# Patient Record
Sex: MALE | Race: WHITE | Employment: FULL TIME | URBAN - METROPOLITAN AREA
[De-identification: names, ages, dates, MRNs, and addresses within clinical notes are randomized per-mention and may not be internally consistent; named-entity substitution may affect disease eponyms.]

---

## 2022-04-07 DIAGNOSIS — M25.561 RIGHT KNEE PAIN, UNSPECIFIED CHRONICITY: Primary | ICD-10-CM

## 2022-04-11 ENCOUNTER — HOSPITAL ENCOUNTER (OUTPATIENT)
Dept: GENERAL RADIOLOGY | Age: 52
Discharge: HOME | End: 2022-04-11
Payer: COMMERCIAL

## 2022-04-11 DIAGNOSIS — M25.561 RIGHT KNEE PAIN, UNSPECIFIED CHRONICITY: ICD-10-CM

## 2022-04-11 PROCEDURE — 73564 X-RAY EXAM KNEE 4 OR MORE: CPT

## 2022-04-15 ENCOUNTER — OFFICE VISIT (OUTPATIENT)
Dept: ORTHOPEDIC SURGERY | Age: 52
End: 2022-04-15
Payer: COMMERCIAL

## 2022-04-15 VITALS
DIASTOLIC BLOOD PRESSURE: 94 MMHG | HEART RATE: 66 BPM | TEMPERATURE: 97.8 F | SYSTOLIC BLOOD PRESSURE: 137 MMHG | BODY MASS INDEX: 27.09 KG/M2 | WEIGHT: 200 LBS | HEIGHT: 72 IN | OXYGEN SATURATION: 99 %

## 2022-04-15 DIAGNOSIS — M17.11 UNILATERAL PRIMARY OSTEOARTHRITIS, RIGHT KNEE: Primary | ICD-10-CM

## 2022-04-15 PROCEDURE — 99203 OFFICE O/P NEW LOW 30 MIN: CPT | Performed by: ORTHOPAEDIC SURGERY

## 2022-04-15 NOTE — PROGRESS NOTES
4/15/2022    Chief Complaint: Right knee pain    Assessment: Osteoarthritis right knee    Plan: This patient and I did discuss the many options in treating knee osteoarthritis. We did discuss that we could continue to seek out nonoperative modalities, such as: NSAIDs, oral and topical analgesics, knee injections, knee braces, physical therapy, stretching, strengthening, and weight loss strategies, activity modification, ambulatory assistive devices. The patient stated their understanding with this, but and would like to proceed with nonsurgical management in the form of knee brace. HPI: This is a 45 y.o. male who complains of right knee pain. Onset was relatively quickly but without trauma. The patient has had activity dependent pain for one month. The patient has tried activity modification, physical therapy exercises, injections have not been attempted. The pain is in the lateral and medial knee, it is moderate in intensity. The patient feels unstable with the knee, fears falling, and has significant limitation with activities of daily living, recreation, and walks with a limp. Past Medical History:   Diagnosis Date    GERD (gastroesophageal reflux disease)        Past Surgical History:   Procedure Laterality Date    HX HEENT      SINUS SX X2    HX OTHER SURGICAL      VASECTOMY       Current Outpatient Medications on File Prior to Visit   Medication Sig Dispense Refill    azithromycin (ZITHROMAX Z-SUMANTH) 250 mg tablet Take 500 mg by mouth on day 1, then take 250 mg by mouth every 24 hours x 4 days (Patient not taking: Reported on 4/15/2022) 6 Tab 0    guaiFENesin-codeine (GUAIFENESIN AC) 100-10 mg/5 mL solution Take 10 mL by mouth three (3) times daily as needed for Cough. Max Daily Amount: 30 mL. (Patient not taking: Reported on 4/15/2022) 118 mL 0     No current facility-administered medications on file prior to visit.        No Known Allergies    Family History   Problem Relation Age of Onset  Hypertension Father        Social History     Socioeconomic History    Marital status:    Tobacco Use    Smoking status: Former Smoker    Smokeless tobacco: Never Used   Substance and Sexual Activity    Alcohol use: Yes     Comment: OCCASIONAL         Review of Systems:       General: Denies headache, lethargy, fever, weight loss  Ears/Nose/Throat: Denies ear discharge, drainage, nosebleeds, hoarse voice, dental problems  Cardiovascular: Denies chest pain, shortness of breath  Lungs: Denies chest pain, breathing problems, wheezing, pneumonia  Stomach: Denies stomach pain, heartburn, constipation, irritable bowel  Skin: Denies rash, sores, open wounds  Musculoskeletal: Admits to knee pain  Genitourinary: Denies dysuria, hematuria, polyuria  Gastrointestinal: Denies constipation, obstipation, diarrhea  Neurological: Denies changes in sight, smell, hearing, taste, seizures. Denies loss of consciousness.   Psychiatric: Denies depression, sleep pattern changes, anxiety, change in personality  Endocrine: Denies mood swings, heat or cold intolerance  Hematologic/Lymphatic: Denies anemia, purpura, petechia  Allergic/Immunologic: Denies swelling of throat, pain or swelling at lymph nodes      Physical Examination:    Visit Vitals  BP (!) 137/94 (BP 1 Location: Right arm, BP Patient Position: Sitting, BP Cuff Size: Large adult)   Pulse 66   Temp 97.8 °F (36.6 °C) (Tympanic)   Ht 6' (1.829 m)   Wt 200 lb (90.7 kg)   SpO2 99%   BMI 27.12 kg/m²        General: AOX3, no apparent distress  Psychiatric: mood and affect appropriate  Lungs: breathing is symmetric and unlabored bilaterally  Heart: regular rate and rhythm  Abdomen: no guarding  Head: normocephalic, atraumatic  Skin: No significant abnormalities, good turgor  Sensation intact to light touch: L1-S1 dermatomes  Muscular exam: 5/5 strength in all major muscle groups unless noted in specialty exam.    Extremities:      Left upper extremity: Full active and passive range of motion without pain, deformity, no open wound, strength 5/5 in all major muscle groups. Right upper extremity: Full active and passive range of motion without pain, deformity, no open wound, strength 5/5 in all major muscle groups. Left lower extremity: Full active and passive range of motion without pain, deformity, no open wound, strength 5/5 in all major muscle groups. Right lower extremity:  Varus deformity is noted. Range of motion of the knee is 0-125. Ligamentous testing of the knee indicates stability of the the MCL, LCL, PCL, and ACL. Lachman's, anterior and posterior drawer tests are specifically negative. Medial and lateral joint line tenderness to palpation is noted. Popliteal area is unremarkable. 1+  for effusion. + patellar crepitus. Patella tracks centrally. Pivot shift is negative. Strength testing is indicative of 5/5 strength at hip flexion, extension, knee flexion and extension, tibialis anterior, EHL, and FHL. Sensation is intact to light touch in the L1-S1 dermatomes. Capillary refill is less than 2 seconds in the toes. Diagnostics:    Pertinent Diagnostics:  Xrays are available of the right knee, they indicate moderate medial compartment, mild patellofemoral osteoarthritis of the knee joint, no significant other findings, no other osseus abnormalities, fractures, or dislocations. Procedures: none    Mr. Kailey Vyas has a reminder for a \"due or due soon\" health maintenance. I have asked that he contact his primary care provider for follow-up on this health maintenance.

## 2022-04-15 NOTE — PROGRESS NOTES
Identified pt with two pt identifiers (name and ). Reviewed chart in preparation for visit and have obtained necessary documentation. Mishel Garcia is a 45 y.o. male  Chief Complaint   Patient presents with    Knee Pain     RT Knee     Visit Vitals  BP (!) 137/94 (BP 1 Location: Right arm, BP Patient Position: Sitting, BP Cuff Size: Large adult)   Pulse 66   Temp 97.8 °F (36.6 °C) (Tympanic)   Ht 6' (1.829 m)   Wt 200 lb (90.7 kg)   SpO2 99%   BMI 27.12 kg/m²     1. Have you been to the ER, urgent care clinic since your last visit? Hospitalized since your last visit? No    2. Have you seen or consulted any other health care providers outside of the [address] [place] [address] since your last visit? Include any pap smears or colon screening.  No

## 2022-04-15 NOTE — LETTER
4/15/2022    Patient: Benoit Vega   YOB: 1970   Date of Visit: 4/15/2022     Pankaj Farley MD  [address]  [address] [address]  Via Fax: [phone]    Dear Pankaj Farley MD,      Thank you for referring Mr. Tabatha Gonzales to [facility] for evaluation. My notes for this consultation are attached. If you have questions, please do not hesitate to call me. I look forward to following your patient along with you.       Sincerely,    Veronica Barber, DO

## 2022-07-25 DIAGNOSIS — M25.562 LEFT KNEE PAIN, UNSPECIFIED CHRONICITY: Primary | ICD-10-CM

## 2022-07-27 ENCOUNTER — HOSPITAL ENCOUNTER (OUTPATIENT)
Dept: GENERAL RADIOLOGY | Age: 52
Discharge: HOME | End: 2022-07-27
Payer: COMMERCIAL

## 2022-07-27 ENCOUNTER — OFFICE VISIT (OUTPATIENT)
Dept: ORTHOPEDIC SURGERY | Age: 52
End: 2022-07-27
Payer: COMMERCIAL

## 2022-07-27 VITALS
TEMPERATURE: 96.2 F | OXYGEN SATURATION: 98 % | BODY MASS INDEX: 26.01 KG/M2 | WEIGHT: 192 LBS | HEIGHT: 72 IN | HEART RATE: 68 BPM | SYSTOLIC BLOOD PRESSURE: 121 MMHG | DIASTOLIC BLOOD PRESSURE: 87 MMHG

## 2022-07-27 DIAGNOSIS — M25.562 LEFT KNEE PAIN, UNSPECIFIED CHRONICITY: ICD-10-CM

## 2022-07-27 DIAGNOSIS — M17.11 UNILATERAL PRIMARY OSTEOARTHRITIS, RIGHT KNEE: Primary | ICD-10-CM

## 2022-07-27 PROCEDURE — 20610 DRAIN/INJ JOINT/BURSA W/O US: CPT | Performed by: ORTHOPAEDIC SURGERY

## 2022-07-27 PROCEDURE — 73564 X-RAY EXAM KNEE 4 OR MORE: CPT

## 2022-07-27 RX ORDER — BETAMETHASONE SODIUM PHOSPHATE AND BETAMETHASONE ACETATE 3; 3 MG/ML; MG/ML
6 INJECTION, SUSPENSION INTRA-ARTICULAR; INTRALESIONAL; INTRAMUSCULAR; SOFT TISSUE ONCE
Status: COMPLETED | OUTPATIENT
Start: 2022-07-27 | End: 2022-07-27

## 2022-07-27 RX ADMIN — BETAMETHASONE SODIUM PHOSPHATE AND BETAMETHASONE ACETATE 6 MG: 3; 3 INJECTION, SUSPENSION INTRA-ARTICULAR; INTRALESIONAL; INTRAMUSCULAR; SOFT TISSUE at 10:47

## 2022-07-27 NOTE — PROGRESS NOTES
[unfilled]  Luis Ish is a 45 y.o. male  Chief Complaint   Patient presents with    Knee Pain     Lt knee     Visit Vitals  /87 (BP 1 Location: Left arm, BP Patient Position: Sitting, BP Cuff Size: Adult)   Pulse 68   Temp (!) 96.2 °F (35.7 °C) (Tympanic)   Ht 6' (1.829 m)   Wt 192 lb (87.1 kg)   SpO2 98%   BMI 26.04 kg/m²     1. Have you been to the ER, urgent care clinic since your last visit? Hospitalized since your last visit? No    2. Have you seen or consulted any other health care providers outside of the [address] [place] [address] since your last visit? Include any pap smears or colon screening.  No

## 2022-07-27 NOTE — PROGRESS NOTES
7/27/2022      CC: Left knee pain    HPI:      This is a 45y.o. year old patient who complains of left knee pain, this is on the medial aspect of the knee, it has been present for a week. Onset was sudden without trauma. This pain is activity dependent, it causes a significant amount of difficulty with athletic activities and stairs. The patient complains of mechanical symptoms and clicking within the knee. It has gotten somewhat better in the past few days. PMH:  Past Medical History:   Diagnosis Date    GERD (gastroesophageal reflux disease)        PSxHx:  Past Surgical History:   Procedure Laterality Date    HX HEENT      SINUS SX X2    HX OTHER SURGICAL      VASECTOMY       Meds:    Current Outpatient Medications:     azithromycin (ZITHROMAX Z-SUMANTH) 250 mg tablet, Take 500 mg by mouth on day 1, then take 250 mg by mouth every 24 hours x 4 days, Disp: 6 Tab, Rfl: 0    guaiFENesin-codeine (GUAIFENESIN AC) 100-10 mg/5 mL solution, Take 10 mL by mouth three (3) times daily as needed for Cough.  Max Daily Amount: 30 mL., Disp: 118 mL, Rfl: 0    Current Facility-Administered Medications:     betamethasone (CELESTONE) injection 6 mg, 6 mg, Intra artICUlar, ONCE, Krystal Aguiar, DO    All:  No Known Allergies    Social Hx:  Social History     Socioeconomic History    Marital status:    Tobacco Use    Smoking status: Former    Smokeless tobacco: Never   Substance and Sexual Activity    Alcohol use: Yes     Comment: OCCASIONAL       Family Hx:  Family History   Problem Relation Age of Onset    Hypertension Father          Review of Systems:       General: Denies headache, lethargy, fever, weight loss  Ears/Nose/Throat: Denies ear discharge, drainage, nosebleeds, hoarse voice, dental problems  Cardiovascular: Denies chest pain, shortness of breath  Lungs: Denies chest pain, breathing problems, wheezing, pneumonia  Stomach: Denies stomach pain, heartburn, constipation, irritable bowel  Skin: Denies rash, sores, open wounds  Musculoskeletal: left knee pain  Genitourinary: Denies dysuria, hematuria, polyuria  Gastrointestinal: Denies constipation, obstipation, diarrhea  Neurological: Denies changes in sight, smell, hearing, taste, seizures. Denies loss of consciousness. Psychiatric: Denies depression, sleep pattern changes, anxiety, change in personality  Endocrine: Denies mood swings, heat or cold intolerance  Hematologic/Lymphatic: Denies anemia, purpura, petechia  Allergic/Immunologic: Denies swelling of throat, pain or swelling at lymph nodes      Physical Examination:    Visit Vitals  /87 (BP 1 Location: Left arm, BP Patient Position: Sitting, BP Cuff Size: Adult)   Pulse 68   Temp (!) 96.2 °F (35.7 °C) (Tympanic)   Ht 6' (1.829 m)   Wt 192 lb (87.1 kg)   SpO2 98%   BMI 26.04 kg/m²        General: AOX3, no apparent distress  Psychiatric: mood and affect appropriate  Lungs: breathing is symmetric and unlabored bilaterally  Heart: regular rate and rhythm  Abdomen: no guarding  Head: normocephalic, atraumatic  Skin: No significant abnormalities, good turgor  Sensation intact to light touch: C5-T1 dermatomes  Muscular exam: 5/5 strength in all major muscle groups unless noted in specialty exam.    Extremities      Right upper extremity: Extremity is examined, no evidence of gross deformity, no gross motor or sensory deficit. Full active and passive range of motion is noted. Muscle tone and bulk is within normal expected limits. Capillary refill is less than 2 seconds distally. Left upper extremity: Extremity is examined, no evidence of gross deformity, no gross motor or sensory deficit. Full active and passive range of motion is noted. Muscle tone and bulk is within normal expected limits. Capillary refill is less than 2 seconds distally. Right lower extremity: Extremity is examined, no evidence of gross deformity, no gross motor or sensory deficit. Full active and passive range of motion is noted. Muscle tone and bulk is within normal expected limits. Capillary refill is less than 2 seconds distally. Left lower extremity: No gross deformity. Knee indicates small effusion. Significant joint line tenderness to palpation medially, not laterally. Positive Chula's medially, not laterally. ACL, PCL, MCL, LCL are all intact to ligamentous testing. Capillary refill is less than 2 seconds distally. Knee flexion and extension is 5 out of 5 strength. Patella tracks centrally, no patellar grind. Diagnostics:    Pertinent Diagnostics: Xrays of the left knee indicate no fractures, osseus lesions, abnormalities, cartilage space is well maintained. Overall alignment is within normal limits, no effusion or other soft tissue abnormality. Assessment: Left knee pain, likely secondary to medial meniscal tear    Plan: This patient has the above-mentioned issue, I have had a long discussion with them regarding the treatment options which include nonoperative and operative. Due to the length of symptoms, as well as the clinical scenario, we have agreed to proceed with nonoperative management. I have advised the patient that should they have worsening symptoms, mechanical blockage to motion, or locked knee that they should call on an emergent basis. Otherwise, I did discuss the treatment options for this particular issue which include bracing, physical therapy, anti-inflammatories and pain medications as well as activity modification. I have also discussed the long-term effects of this particular issue which include progression of osteoarthritis faster than would normally have occurred. The patient will have an injection, bracing, diclofenac    Follow-up will be in 1 week, the patient does not need x-rays on follow-up. Mr. Manuel Chen has a reminder for a \"due or due soon\" health maintenance. I have asked that he contact his primary care provider for follow-up on this health maintenance.   Date of Procedure: 7/27/2022  PROCEDURE NOTE: Left knee injection of Celestone    Consent was obtained from the patient. The correct site was identified after confirmation with the patient. Following identification and confirmation of the correct site with the patient, the superolateral left knee was prepped in the normal sterile fashion. A local anesthetic of 1% lidocaine without epinephrine was then administered to the local tissues. Following, an injection of a mixture of  6 mg Celestone and 1% lidocaine without epinephrine was administered to the left knee. The needle was then withdrawn and the injection site dressed with a sterile bandage at the conclusion. The procedure was well tolerated by the patient. No immediate adverse reactions were noted. Post injection instructions were given.

## 2022-08-04 ENCOUNTER — VIRTUAL VISIT (OUTPATIENT)
Dept: ORTHOPEDIC SURGERY | Age: 52
End: 2022-08-04
Payer: COMMERCIAL

## 2022-08-04 DIAGNOSIS — M25.562 CHRONIC PAIN OF LEFT KNEE: Primary | ICD-10-CM

## 2022-08-04 DIAGNOSIS — G89.29 CHRONIC PAIN OF LEFT KNEE: Primary | ICD-10-CM

## 2022-08-04 PROCEDURE — 99212 OFFICE O/P EST SF 10 MIN: CPT | Performed by: ORTHOPAEDIC SURGERY

## 2022-08-05 NOTE — PROGRESS NOTES
8/5/2022      CC: left knee pain    HPI:      This is a 47y.o. year old male who presents for follow up of their left knee injection. The patient states that they have had no significant relief of their pain. The time since injection has been approximately a week. PMH:  Past Medical History:   Diagnosis Date    GERD (gastroesophageal reflux disease)        PSxHx:  Past Surgical History:   Procedure Laterality Date    HX HEENT      SINUS SX X2    HX OTHER SURGICAL      VASECTOMY       Meds:    Current Outpatient Medications:     azithromycin (ZITHROMAX Z-SUMANTH) 250 mg tablet, Take 500 mg by mouth on day 1, then take 250 mg by mouth every 24 hours x 4 days, Disp: 6 Tab, Rfl: 0    guaiFENesin-codeine (GUAIFENESIN AC) 100-10 mg/5 mL solution, Take 10 mL by mouth three (3) times daily as needed for Cough. Max Daily Amount: 30 mL., Disp: 118 mL, Rfl: 0    All:  No Known Allergies    Social Hx:  Social History     Socioeconomic History    Marital status:    Tobacco Use    Smoking status: Former    Smokeless tobacco: Never   Substance and Sexual Activity    Alcohol use: Yes     Comment: OCCASIONAL       Family Hx:  Family History   Problem Relation Age of Onset    Hypertension Father          Review of Systems:       General: Denies headache, lethargy, fever, weight loss  Ears/Nose/Throat: Denies ear discharge, drainage, nosebleeds, hoarse voice, dental problems  Cardiovascular: Denies chest pain, shortness of breath  Lungs: Denies chest pain, breathing problems, wheezing, pneumonia  Stomach: Denies stomach pain, heartburn, constipation, irritable bowel  Skin: Denies rash, sores, open wounds  Musculoskeletal: left knee pain - unresolved  Genitourinary: Denies dysuria, hematuria, polyuria  Gastrointestinal: Denies constipation, obstipation, diarrhea  Neurological: Denies changes in sight, smell, hearing, taste, seizures. Denies loss of consciousness.   Psychiatric: Denies depression, sleep pattern changes, anxiety, change in personality  Endocrine: Denies mood swings, heat or cold intolerance  Hematologic/Lymphatic: Denies anemia, purpura, petechia  Allergic/Immunologic: Denies swelling of throat, pain or swelling at lymph nodes      Physical Examination:    There were no vitals taken for this visit. General: AOX3, no apparent distress  Psychiatric: mood and affect appropriate        Diagnostics:    Pertinent Diagnostics: none    Assessment: Status post left knee injection  Plan: This patient and I discussed the normal course for injections, we discussed that pain relief will likely be temporary to some degree, but I cannot predict the longevity of relief. We also discussed the limitations of injections, and that I cannot inject the same area any more often than every three months. We will proceed with MRI. Patient was in [place] at the time of consultation. I was in the office while conducting this encounter. Consent:  He and/or his healthcare decision maker is aware that this patient-initiated Telehealth encounter is a billable service, with coverage as determined by his insurance carrier. He is aware that he may receive a bill and has provided verbal consent to proceed: Yes    This virtual visit was conducted telephone encounter only. -  I affirm this is a Patient Initiated Episode with an Established Patient who has not had a related appointment within my department in the past 7 days or scheduled within the next 24 hours. Note: this encounter is not billable if this call serves to triage the patient into an appointment for the relevant concern. Total Time: minutes: 5-10 minutes. Mr. Elroy Foley has a reminder for a \"due or due soon\" health maintenance. I have asked that he contact his primary care provider for follow-up on this health maintenance.

## 2022-08-26 ENCOUNTER — HOSPITAL ENCOUNTER (OUTPATIENT)
Dept: MRI IMAGING | Age: 52
Discharge: HOME | End: 2022-08-26
Attending: ORTHOPAEDIC SURGERY
Payer: COMMERCIAL

## 2022-08-26 DIAGNOSIS — G89.29 CHRONIC PAIN OF LEFT KNEE: ICD-10-CM

## 2022-08-26 DIAGNOSIS — M25.562 CHRONIC PAIN OF LEFT KNEE: ICD-10-CM

## 2022-08-26 PROCEDURE — 73721 MRI JNT OF LWR EXTRE W/O DYE: CPT

## 2022-08-29 ENCOUNTER — TELEPHONE (OUTPATIENT)
Dept: ORTHOPEDIC SURGERY | Age: 52
End: 2022-08-29

## 2022-09-06 ENCOUNTER — VIRTUAL VISIT (OUTPATIENT)
Dept: ORTHOPEDIC SURGERY | Age: 52
End: 2022-09-06
Payer: COMMERCIAL

## 2022-09-06 ENCOUNTER — TELEPHONE (OUTPATIENT)
Dept: ORTHOPEDIC SURGERY | Age: 52
End: 2022-09-06

## 2022-09-06 DIAGNOSIS — S83.232D COMPLEX TEAR OF MEDIAL MENISCUS OF LEFT KNEE AS CURRENT INJURY, SUBSEQUENT ENCOUNTER: Primary | ICD-10-CM

## 2022-09-06 PROCEDURE — 99213 OFFICE O/P EST LOW 20 MIN: CPT | Performed by: ORTHOPAEDIC SURGERY

## 2022-09-06 NOTE — TELEPHONE ENCOUNTER
Pt needs to speak with wife first in regards to a date. Pt will c/b.       ----- Message from Kyrie Mejia DO sent at 9/6/2022  8:09 AM EDT -----  This patient will be calling to schedule surgery, he just needs to speak with his wife, this is the posting so that you have a clinic call.       Diagnosis: S83.232D  Procedure: Left knee arthroscopy, partial medial meniscectomy  CPT: 97553  Operative minutes: 60  Disposition postop: home  Location: [facility] OR  PAT: yes  Class: no  Special Equipment: knee arthroscopy, shaver, biter set  Staffing: Assistant/retractor harris: no  Anesthesia: general

## 2022-09-06 NOTE — PROGRESS NOTES
9/6/2022      CC: left knee pain    HPI:      This is a 47y.o. year old male who has left knee pain, this is chronic, I did order an MRI and he presents for review. Continues to have pains in his left knee. PMH:  Past Medical History:   Diagnosis Date    GERD (gastroesophageal reflux disease)        PSxHx:  Past Surgical History:   Procedure Laterality Date    HX HEENT      SINUS SX X2    HX OTHER SURGICAL      VASECTOMY       Meds:    Current Outpatient Medications:     azithromycin (ZITHROMAX Z-SUMANTH) 250 mg tablet, Take 500 mg by mouth on day 1, then take 250 mg by mouth every 24 hours x 4 days, Disp: 6 Tab, Rfl: 0    guaiFENesin-codeine (GUAIFENESIN AC) 100-10 mg/5 mL solution, Take 10 mL by mouth three (3) times daily as needed for Cough. Max Daily Amount: 30 mL., Disp: 118 mL, Rfl: 0    All:  No Known Allergies    Social Hx:  Social History     Socioeconomic History    Marital status:    Tobacco Use    Smoking status: Former    Smokeless tobacco: Never   Substance and Sexual Activity    Alcohol use: Yes     Comment: OCCASIONAL       Family Hx:  Family History   Problem Relation Age of Onset    Hypertension Father          Review of Systems:       General: Denies headache, lethargy, fever, weight loss  Ears/Nose/Throat: Denies ear discharge, drainage, nosebleeds, hoarse voice, dental problems  Cardiovascular: Denies chest pain, shortness of breath  Lungs: Denies chest pain, breathing problems, wheezing, pneumonia  Stomach: Denies stomach pain, heartburn, constipation, irritable bowel  Skin: Denies rash, sores, open wounds  Musculoskeletal: Left knee pain  Genitourinary: Denies dysuria, hematuria, polyuria  Gastrointestinal: Denies constipation, obstipation, diarrhea  Neurological: Denies changes in sight, smell, hearing, taste, seizures. Denies loss of consciousness.   Psychiatric: Denies depression, sleep pattern changes, anxiety, change in personality  Endocrine: Denies mood swings, heat or cold intolerance  Hematologic/Lymphatic: Denies anemia, purpura, petechia  Allergic/Immunologic: Denies swelling of throat, pain or swelling at lymph nodes      Physical Examination:    There were no vitals taken for this visit. General: AOX3, no apparent distress  Psychiatric: mood and affect appropriate        Diagnostics:    Pertinent Diagnostics: MRI ordered and reviewed by myself today indicates medial meniscal tear, posterior medial horn, mild degenerative changes, otherwise no abnormalities. Assessment: Left knee medial meniscal tear, mild degenerative joint disease  Plan: This patient I discussed the treatment options, he does have mechanical symptoms, he has failed injection, therapy type exercises, anti-inflammatories and other conservative means. We did discuss that arthroscopy could be a helpful and permanent solution for him. We did discuss the risks of surgery which include but are not limited to infection, nerve or blood vessel damage, failure of fixation, failure of any possible implant, need for reoperation, postoperative pain and swelling, intra-or postoperative fracture, postoperative dislocation, leg length inequality, need for reoperation, implant failure, death, disability, organ dysfunction, wound healing issues, DVT, PE, and the need for further procedures. The patient did freely state their understanding and satisfaction with our discussion. We will proceed after medical clearances. The patient was counseled at length about the risks of neda Covid-19 during their perioperative period and any recovery window from their procedure. The patient was made aware that neda Covid-19  may worsen their prognosis for recovering from their procedure and lend to a higher morbidity and/or mortality risk. All material risks, benefits, and reasonable alternatives including postponing the procedure were discussed. The patient does  wish to proceed with the procedure at this time.     He will call to schedule in the future. I was in the office while conducting this encounter. Consent:  He and/or his healthcare decision maker is aware that this patient-initiated Telehealth encounter is a billable service, with coverage as determined by his insurance carrier. He is aware that he may receive a bill and has provided verbal consent to proceed: Yes    This virtual visit was conducted via [email or web address]. Pursuant to the emergency declaration under the [address] [place] [address], [facility] waiver authority and the [employer] and [employer] Act, this Virtual  Visit was conducted to reduce the patient's risk of exposure to COVID-19 and provide continuity of care for an established patient. Services were provided through a video synchronous discussion virtually to substitute for in-person clinic visit. Due to this being a TeleHealth evaluation, many elements of the physical examination are unable to be assessed. Total Time: minutes: 21-30 minutes. Mr. Wesly Stein has a reminder for a \"due or due soon\" health maintenance. I have asked that he contact his primary care provider for follow-up on this health maintenance.

## 2022-11-08 NOTE — PROGRESS NOTES
HPI: Mansoor Momin (: 1970) is a 47 y.o. male, patient, here for evaluation of the following chief complaint(s): Patient presents with complaint of lump at the right ring finger dorsal aspect the proximal phalanx. He has noticed this for about 16 months and has noticed an increase in size. It is painful if he makes contact. He denies any known injury. No tingling or numbness to the hand. No other complaints or concerns. X-rays of the right ring finger obtained today. Finger Pain (Right ring finger swelling at proximal phalanx for 16 months, gradually larger. No injury. Right hand dominant .)       Vitals:  Ht 6' (1.829 m)   Wt 192 lb (87.1 kg)   BMI 26.04 kg/m²    Body mass index is 26.04 kg/m². No Known Allergies    Current Outpatient Medications   Medication Sig    guaiFENesin-codeine (GUAIFENESIN AC) 100-10 mg/5 mL solution Take 10 mL by mouth three (3) times daily as needed for Cough. Max Daily Amount: 30 mL. No current facility-administered medications for this visit. Past Medical History:   Diagnosis Date    GERD (gastroesophageal reflux disease)         Past Surgical History:   Procedure Laterality Date    HX HEENT      SINUS SX X2    HX OTHER SURGICAL      VASECTOMY       Family History   Problem Relation Age of Onset    Hypertension Father         Social History     Tobacco Use    Smoking status: Former    Smokeless tobacco: Never   Substance Use Topics    Alcohol use: Yes     Comment: OCCASIONAL        Review of Systems    Constitutional: No fevers, chills, night sweats, excessive fatigue or weight loss. Musculoskeletal: + Right ring finger lump. Neurologic: No headache, blurred vision, and no areas of focal weakness or numbness. Normal gait. No sensory problems. Respiratory: No dyspnea on exertion, orthopnea, chest pain, cough or hemoptysis.   Cardiovascular: No anginal chest pain, irregular heart beat, tachycardia, palpitations or orthopnea  Integumentary: No chronic rashes, inflammation, ulcerations, pruritus, petechiae, purpura, ecchymoses, or skin changes       Physical Exam    General: Alert, cooperative, no distress  Musculosketal: Right hand - Full range of motion. Normal sensation. No erythema, edema or warmth to the joints. 1 cm x 1 cm cyst-like lesion at the dorsal aspect of the right ring finger proximal phalanx. Slightly tender to palpation. Neurologic:  CNII-XII intact, Normal strength, sensation, and reflexes throughout    XR Results (most recent):  Results from Appointment encounter on 11/09/22    XR 4TH FINGER RT MIN 2 V    Narrative  Mild joint space narrowing consistent with osteoarthritis. No fractures, deformities or erosions appreciated. ASSESSMENT/PLAN:  Below is the assessment and plan developed based on review of pertinent history, physical exam, labs, studies, and medications. Lump at the right ring finger dorsal aspect the proximal phalanx. He has noticed this for about 16 months and has noticed an increase in size. It is painful if he makes contact. Clinical exam is consistent with a ganglion cyst of the extensor tendon vs early onset giant cell tumor of the tendon sheath. He is a good candidate for surgical excision. I reviewed risks that include but are not limited to stiffness, pain, nerve or tendon damage and overall incomplete relief of pain. Arrangements can be made for this to be performed on an outpatient basis at his convenience. 1. Right hand pain  -     XR 4TH FINGER RT MIN 2 V; Future  2. Ganglion cyst of finger of right hand  -     REFERRAL TO SURGERY  3. Primary osteoarthritis of right hand    Return in about 3 weeks (around 11/30/2022). Dr. Raffy Hester was available for immediate consult during this encounter. An electronic signature was used to authenticate this note.   -- Delos Sacks, PA-C

## 2022-11-09 ENCOUNTER — OFFICE VISIT (OUTPATIENT)
Dept: ORTHOPEDIC SURGERY | Age: 52
End: 2022-11-09
Payer: COMMERCIAL

## 2022-11-09 VITALS — HEIGHT: 72 IN | WEIGHT: 192 LBS | BODY MASS INDEX: 26.01 KG/M2

## 2022-11-09 DIAGNOSIS — M79.641 RIGHT HAND PAIN: Primary | ICD-10-CM

## 2022-11-09 DIAGNOSIS — M67.441 GANGLION CYST OF FINGER OF RIGHT HAND: ICD-10-CM

## 2022-11-09 DIAGNOSIS — M19.041 PRIMARY OSTEOARTHRITIS OF RIGHT HAND: ICD-10-CM

## 2022-11-09 PROCEDURE — 99203 OFFICE O/P NEW LOW 30 MIN: CPT | Performed by: PHYSICIAN ASSISTANT

## 2022-11-14 PROBLEM — M19.041 PRIMARY OSTEOARTHRITIS OF RIGHT HAND: Status: ACTIVE | Noted: 2022-11-14

## 2022-12-08 DIAGNOSIS — M67.441 GANGLION CYST OF FINGER OF RIGHT HAND: Primary | ICD-10-CM

## 2022-12-08 RX ORDER — HYDROCODONE BITARTRATE AND ACETAMINOPHEN 5; 325 MG/1; MG/1
1 TABLET ORAL
Qty: 15 TABLET | Refills: 0 | Status: SHIPPED | OUTPATIENT
Start: 2022-12-08 | End: 2022-12-15

## 2022-12-09 ENCOUNTER — HOSPITAL ENCOUNTER (OUTPATIENT)
Dept: LAB | Age: 52
Discharge: HOME | End: 2022-12-09

## 2022-12-21 ENCOUNTER — OFFICE VISIT (OUTPATIENT)
Dept: ORTHOPEDIC SURGERY | Age: 52
End: 2022-12-21
Payer: COMMERCIAL

## 2022-12-21 DIAGNOSIS — M19.041 PRIMARY OSTEOARTHRITIS OF RIGHT HAND: Primary | ICD-10-CM

## 2022-12-21 DIAGNOSIS — D18.00 GLOMANGIOMA: ICD-10-CM

## 2022-12-21 DIAGNOSIS — M67.441 GANGLION CYST OF FINGER OF RIGHT HAND: ICD-10-CM

## 2022-12-21 DIAGNOSIS — M79.641 RIGHT HAND PAIN: ICD-10-CM

## 2022-12-21 PROCEDURE — 99024 POSTOP FOLLOW-UP VISIT: CPT | Performed by: ORTHOPAEDIC SURGERY

## 2022-12-21 NOTE — LETTER
12/21/2022    Patient: Annette Chong   YOB: 1970   Date of Visit: 12/21/2022     Noemi Shah MD  [address] [place] [address]  [phone] [id number]  Via Fax: [phone]    Dear Noemi Shah MD,      Thank you for referring Mr. Kemal Verduzco to [facility] for evaluation. My notes for this consultation are attached. If you have questions, please do not hesitate to call me. I look forward to following your patient along with you.       Sincerely,    Cecilia Nelson MD

## 2022-12-21 NOTE — PROGRESS NOTES
HPI: Neetu Lomax (: 1970) is a 47 y.o. male, patient, here for evaluation of the following chief complaint(s): Patient presents with complaint of lump at the right ring finger dorsal aspect the proximal phalanx. He has noticed this for about 16 months and has noticed an increase in size. It is painful if he makes contact. He denies any known injury. No tingling or numbness to the hand. No other complaints or concerns. Radiographs did not reveal any evidence of a soft tissue mass. He underwent surgical exploration with removal of what appeared to represent a vascular growth on 2022. 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 Surgical Follow-up (Right ring finger dorsal vascular malformation subcutaneous tissue excision on 22.)       Vitals: There were no vitals taken for this visit. There is no height or weight on file to calculate BMI. No Known Allergies    Current Outpatient Medications   Medication Sig    guaiFENesin-codeine (GUAIFENESIN AC) 100-10 mg/5 mL solution Take 10 mL by mouth three (3) times daily as needed for Cough. Max Daily Amount: 30 mL. No current facility-administered medications for this visit. Past Medical History:   Diagnosis Date    GERD (gastroesophageal reflux disease)         Past Surgical History:   Procedure Laterality Date    HX HEENT      SINUS SX X2    HX OTHER SURGICAL      VASECTOMY       Family History   Problem Relation Age of Onset    Hypertension Father         Social History     Tobacco Use    Smoking status: Former    Smokeless tobacco: Never   Substance Use Topics    Alcohol use: Yes     Comment: OCCASIONAL        Review of Systems   All other systems reviewed and are negative. Constitutional: No fevers, chills, night sweats, excessive fatigue or weight loss. Musculoskeletal: + Right ring finger lump. Neurologic: No headache, blurred vision, and no areas of focal weakness or numbness. Normal gait.  No sensory problems. Respiratory: No dyspnea on exertion, orthopnea, chest pain, cough or hemoptysis. Cardiovascular: No anginal chest pain, irregular heart beat, tachycardia, palpitations or orthopnea  Integumentary: No chronic rashes, inflammation, ulcerations, pruritus, petechiae, purpura, ecchymoses, or skin changes       Physical Exam    Overall the right ring finger dorsal wound is healing well. He has full extension and flexion. Sutures removed on 12/21/2022. No real redness slight pink coloration but no drainage or sign of infection. Full motion. XR Results (most recent):  Results from Appointment encounter on 11/09/22    XR 4TH FINGER RT MIN 2 V    Narrative  Mild joint space narrowing consistent with osteoarthritis. No fractures, deformities or erosions appreciated. FINAL PATHOLOGIC DIAGNOSIS    Right ring finger cyst, excision:        Glomangioma. [facility]/12/15/2022   Electronically Signed Out By Maya Thurman. Chet Horowitz M.D.     ASSESSMENT/PLAN:  Below is the assessment and plan developed based on review of pertinent history, physical exam, labs, studies, and medications. Lump at the right ring finger dorsal aspect the proximal phalanx. He has noticed this for about 16 months and has noticed an increase in size. It is painful if he makes contact. Clinical exam is consistent with a ganglion cyst of the extensor tendon vs early onset giant cell tumor of the tendon sheath. He elected undergo excision of the vascular malformation like lesion on 12/9/2022 that was shown pathologically to represent a glomangioma. He may continue with wound care, gentle motion and strength. Follow-up in 3 to 4 weeks. 1. Primary osteoarthritis of right hand  2. Ganglion cyst of finger of right hand  3. Right hand pain  4. Glomangioma    Return in about 4 weeks (around 1/18/2023).

## 2023-10-06 ENCOUNTER — HOSPITAL ENCOUNTER (OUTPATIENT)
Facility: HOSPITAL | Age: 53
Discharge: HOME | End: 2023-10-06
Attending: OTOLARYNGOLOGY
Payer: COMMERCIAL

## 2023-10-06 DIAGNOSIS — Z78.9 NON-SMOKER: ICD-10-CM

## 2023-10-06 DIAGNOSIS — H93.12 TINNITUS, LEFT: ICD-10-CM

## 2023-10-06 DIAGNOSIS — H90.5 SENSORINEURAL HEARING LOSS (SNHL) OF RIGHT EAR, UNSPECIFIED HEARING STATUS ON CONTRALATERAL SIDE: ICD-10-CM

## 2023-10-06 DIAGNOSIS — Z00.8 ENCOUNTER FOR OTHER GENERAL EXAMINATION: ICD-10-CM

## 2023-10-06 PROCEDURE — A9579 GAD-BASE MR CONTRAST NOS,1ML: HCPCS | Performed by: OTOLARYNGOLOGY

## 2023-10-06 PROCEDURE — 6360000004 HC RX CONTRAST MEDICATION: Performed by: OTOLARYNGOLOGY

## 2023-10-06 PROCEDURE — 70553 MRI BRAIN STEM W/O & W/DYE: CPT

## 2023-10-06 RX ADMIN — GADOTERIDOL 18 ML: 279.3 INJECTION, SOLUTION INTRAVENOUS at 14:49
